# Patient Record
(demographics unavailable — no encounter records)

---

## 2025-01-03 NOTE — REVIEW OF SYSTEMS
[SOB] : no shortness of breath [Dyspnea on exertion] : not dyspnea during exertion [Palpitations] : no palpitations [Negative] : Heme/Lymph

## 2025-01-03 NOTE — DISCUSSION/SUMMARY
[FreeTextEntry1] : He is a 79-year-old with a history of well-controlled hypertension and paroxysmal atrial fibrillation who has Adequate rhythm control, though some of his symptoms appear to be increasing of late. ECG: SR with APCs, PVCs.  PAF: No active symptoms. Continue oral anticoagulation for stroke risk reduction. Hypertension:  well controlled and encouraged him to lose weight.  Continue lisinopril and diltiazem.  No changes. Hyperlipidemia: LDL 73. Continue simvastatin. Labs nex week with [facility]. Follow-up in 6 months. [EKG obtained to assist in diagnosis and management of assessed problem(s)] : EKG obtained to assist in diagnosis and management of assessed problem(s)

## 2025-01-03 NOTE — HISTORY OF PRESENT ILLNESS
[FreeTextEntry1] : Still exercising routinely without difficlty. Rare palpitations. No dizziness or bleeding issues.   Prior: He has been noting increased BP at PMD peloton, 45 min, yoga tiw, walking, golfing. No chest pain or dyspnea. Some digestive issues, but feels great. No bleeding  no leg edema. No palps. OFE treated with CPAP.  Prior: Reports feeling fatigued.  He wakes up with headaches and feels better after a little while (when he eats an apple).He is taking his medication as directed otherwise. No bleeding or bruising issues. He continues to exercise and go hiking with his wife.  Recently he noted some increasing fatigue when walking up a very demanding hill. His wife reports snoring. He notes feeling his episodes of palpitations occur particular when he is lying on his left side at night.  He reports having approximately 6% A-fib episodes based on his watch.  Prior: Didnt take the higher dose of diltiazem. Walks 3 miiles at least four times a week. [place] too. No other changes in meds. Planning oral surgery on Monday.   Prior: abdullahi, kickboxing weights. He has been having episodes of what he describes as missed heartbeats and sound very much like PVCs to me. He is able to exercise without any difficulty.  During these episodes he has no lightheadedness, dizziness or syncope. He has been taking all his medications as directed.  He also notes drinking at least 3 cups of coffee and caffeinated diet soda throughout the day. He is taking his Xarelto without any bleeding issues.   Prior: He reports feeling okay overall and has no bleeding issues.  He is very interested in stopping his oral anticoagulation. He reports that taking flecainide for over a year and has had no palpitations lightheadedness dizziness or syncope.    Prior: Thank you for referring him for cardiovascular management.  He is a 76-year-old who recently moved to [place] from [place] in the [place] who has a history of atrial fibrillation and hypertension who has been doing well.  He describes an episode in 2005 where he felt his heart racing after exercise and evaluation revealed atrial fibrillation.  He underwent quickly relapsed and underwent repeat cardioversion with antiarrhythmic therapy.  He has been maintained on flecainide and diltiazem ever since.  He also takes oral anticoagulation and has had no bleeding or bruising issues. He exercises routinely including bicycle riding and palatine use without chest pains or shortness of breath. He denies any palpitations, lightheadedness, dizziness or syncope. He has no history of coronary artery disease, diabetes mellitus, smoking or family history of premature coronary artery disease.  His mother had a CVA at age 87 his father had a myocardial infarction in his late 70s and his sister recently passed of lung cancer.

## 2025-01-06 NOTE — HEALTH RISK ASSESSMENT
[0] : 2) Feeling down, depressed, or hopeless: Not at all (0) [PHQ-2 Negative - No further assessment needed] : PHQ-2 Negative - No further assessment needed [[id number]] : 0 [Never] : Never [Fully functional (bathing, dressing, toileting, transferring, walking, feeding)] : Fully functional (bathing, dressing, toileting, transferring, walking, feeding) [Fully functional (using the telephone, shopping, preparing meals, housekeeping, doing laundry, using] : Fully functional and needs no help or supervision to perform IADLs (using the telephone, shopping, preparing meals, housekeeping, doing laundry, using transportation, managing medications and managing finances)

## 2025-01-06 NOTE — HISTORY OF PRESENT ILLNESS
[de-identified] : This is annual Preventative examination for this 79 year  old male.  The patient has been generally feeling well with no new complaints A complete evaluation of their current medication was reviewed with them including OTC medication .  Chronic medical problems for which they are being followed by a physician are:       Exercises regularly:  A complete Review of Systems is below but it is noteworthy to mention that this patient denies Chest Pain, Dyspnea on Exertion, Palpitations, urinary problems, rectal bleeding or Gastrointestinal problems at this time.

## 2025-01-06 NOTE — ASSESSMENT
[FreeTextEntry1] : This is a 79 year -old  M who was examined today for an annual preventative physical.  A complete history and physical examination were performed.  The patient seems to follow a healthy lifestyle in that he  follows a good diet and exercises regularly.    Physical examination was entirely within normal limits , including a normal blood pressure and pulse.    The following recommendations were made: Exercise regularly. Maintain a healthy diet. _____________________________________________________

## 2025-07-03 NOTE — HISTORY OF PRESENT ILLNESS
[FreeTextEntry1] : Exercising routinely. peloton, 45 min, yoga tiw, walking, golfing. No chest pain or dyspnea. No bleeding or bruising.   Prior: Still exercising routinely without difficulty. Rare palpitations. No dizziness or bleeding issues.  Prior: He has been noting increased BP at PMD  peloton, 45 min, yoga tiw, walking, golfing. No chest pain or dyspnea. Some digestive issues, but feels great. No bleeding  no leg edema. No palps. OFE treated with CPAP.  Prior: Reports feeling fatigued.  He wakes up with headaches and feels better after a little while (when he eats an apple).He is taking his medication as directed otherwise. No bleeding or bruising issues. He continues to exercise and go hiking with his wife.  Recently he noted some increasing fatigue when walking up a very demanding hill. His wife reports snoring. He notes feeling his episodes of palpitations occur particular when he is lying on his left side at night.  He reports having approximately 6% A-fib episodes based on his watch.  Prior: Didnt take the higher dose of diltiazem. Walks 3 miiles at least four times a week. [place] too. No other changes in meds. Planning oral surgery on Monday.   Prior: abdullahi, kickboxing weights. He has been having episodes of what he describes as missed heartbeats and sound very much like PVCs to me. He is able to exercise without any difficulty.  During these episodes he has no lightheadedness, dizziness or syncope. He has been taking all his medications as directed.  He also notes drinking at least 3 cups of coffee and caffeinated diet soda throughout the day. He is taking his Xarelto without any bleeding issues.   Prior: He reports feeling okay overall and has no bleeding issues.  He is very interested in stopping his oral anticoagulation. He reports that taking flecainide for over a year and has had no palpitations lightheadedness dizziness or syncope.    Prior: Thank you for referring him for cardiovascular management.  He is a 76-year-old who recently moved to [place] from [place] in the [place] who has a history of atrial fibrillation and hypertension who has been doing well.  He describes an episode in 2005 where he felt his heart racing after exercise and evaluation revealed atrial fibrillation.  He underwent quickly relapsed and underwent repeat cardioversion with antiarrhythmic therapy.  He has been maintained on flecainide and diltiazem ever since.  He also takes oral anticoagulation and has had no bleeding or bruising issues. He exercises routinely including bicycle riding and palatine use without chest pains or shortness of breath. He denies any palpitations, lightheadedness, dizziness or syncope. He has no history of coronary artery disease, diabetes mellitus, smoking or family history of premature coronary artery disease.  His mother had a CVA at age 87 his father had a myocardial infarction in his late 70s and his sister recently passed of lung cancer.

## 2025-07-03 NOTE — DISCUSSION/SUMMARY
[FreeTextEntry1] : He is a 79-year-old with a history of well-controlled hypertension and paroxysmal atrial fibrillation who has Adequate rhythm control, though some of his symptoms appear to be increasing of late. ECG: SR with APCs, PVCs. T-wave flattening laterally - new.  PAF: No active symptoms. Continue oral anticoagulation for stroke risk reduction. Hypertension:  well controlled and encouraged him to lose weight.  Continue lisinopril and diltiazem.  No changes. Hyperlipidemia: . restasart statin: lipitor 20 qhs.   abnormal ecg: nuclear stress test and echo Follow-up in 6 months. [EKG obtained to assist in diagnosis and management of assessed problem(s)] : EKG obtained to assist in diagnosis and management of assessed problem(s)

## 2025-07-08 NOTE — HISTORY OF PRESENT ILLNESS
[de-identified] : Mr. ABEBA MELENDEZ is a 80 year old male here today for a follow up of their chronic medical conditions.  has some mild sob with exercise but is having stress test the 23rd  no cp   otherwise doing ok   taking meds   working out

## 2025-07-08 NOTE — ASSESSMENT
[FreeTextEntry1] : HLD:  will check Lipid Panel Continue meds watch diet  avoid fatty foods  HTN:  BP stable continue meds  low salt diet, exercise    Ordered appropriate labs based on diagnosis and prevention .  I spent a total of 30 minutes with this patient including face to face and non face to face time.

## 2025-07-08 NOTE — HISTORY OF PRESENT ILLNESS
[de-identified] : Mr. ABEBA MELENDEZ is a 80 year old male here today for a follow up of their chronic medical conditions.  has some mild sob with exercise but is having stress test the 23rd  no cp   otherwise doing ok   taking meds   working out